# Patient Record
Sex: FEMALE | Race: WHITE | NOT HISPANIC OR LATINO | Employment: OTHER | URBAN - METROPOLITAN AREA
[De-identification: names, ages, dates, MRNs, and addresses within clinical notes are randomized per-mention and may not be internally consistent; named-entity substitution may affect disease eponyms.]

---

## 2023-04-27 ENCOUNTER — HOSPITAL ENCOUNTER (EMERGENCY)
Facility: CLINIC | Age: 78
Discharge: HOME | End: 2023-04-27
Attending: PHYSICIAN ASSISTANT | Admitting: PHYSICIAN ASSISTANT
Payer: MEDICARE

## 2023-04-27 VITALS
SYSTOLIC BLOOD PRESSURE: 195 MMHG | RESPIRATION RATE: 20 BRPM | HEART RATE: 77 BPM | DIASTOLIC BLOOD PRESSURE: 89 MMHG | TEMPERATURE: 98.1 F | WEIGHT: 170 LBS | OXYGEN SATURATION: 96 %

## 2023-04-27 DIAGNOSIS — M54.32 LEFT SIDED SCIATICA: ICD-10-CM

## 2023-04-27 PROCEDURE — 99284 EMERGENCY DEPT VISIT MOD MDM: CPT | Performed by: PHYSICIAN ASSISTANT

## 2023-04-27 PROCEDURE — 96372 THER/PROPH/DIAG INJ SC/IM: CPT | Performed by: PHYSICIAN ASSISTANT

## 2023-04-27 PROCEDURE — 250N000011 HC RX IP 250 OP 636: Performed by: PHYSICIAN ASSISTANT

## 2023-04-27 RX ORDER — ORPHENADRINE CITRATE 30 MG/ML
60 INJECTION INTRAMUSCULAR; INTRAVENOUS ONCE
Status: COMPLETED | OUTPATIENT
Start: 2023-04-27 | End: 2023-04-27

## 2023-04-27 RX ORDER — IBUPROFEN 600 MG/1
600 TABLET, FILM COATED ORAL EVERY 6 HOURS PRN
COMMUNITY

## 2023-04-27 RX ORDER — PREDNISONE 20 MG/1
40 TABLET ORAL DAILY
Qty: 10 TABLET | Refills: 0 | Status: SHIPPED | OUTPATIENT
Start: 2023-04-27

## 2023-04-27 RX ORDER — OXYCODONE HYDROCHLORIDE 5 MG/1
5 TABLET ORAL EVERY 6 HOURS PRN
Qty: 12 TABLET | Refills: 0 | Status: SHIPPED | OUTPATIENT
Start: 2023-04-27

## 2023-04-27 RX ORDER — KETOROLAC TROMETHAMINE 30 MG/ML
30 INJECTION, SOLUTION INTRAMUSCULAR; INTRAVENOUS ONCE
Status: COMPLETED | OUTPATIENT
Start: 2023-04-27 | End: 2023-04-27

## 2023-04-27 RX ADMIN — KETOROLAC TROMETHAMINE 30 MG: 30 INJECTION, SOLUTION INTRAMUSCULAR at 21:19

## 2023-04-27 RX ADMIN — ORPHENADRINE CITRATE 60 MG: 30 INJECTION INTRAMUSCULAR; INTRAVENOUS at 21:19

## 2023-04-27 ASSESSMENT — ACTIVITIES OF DAILY LIVING (ADL): ADLS_ACUITY_SCORE: 35

## 2023-04-27 NOTE — ED TRIAGE NOTES
Pt presents with severe left posterior leg pain. Pt unable to sit down. Pt states has been going on for one month.      Triage Assessment       Row Name 04/27/23 6714       Triage Assessment (Adult)    Airway WDL WDL       Respiratory WDL    Respiratory WDL WDL       Skin Circulation/Temperature WDL    Skin Circulation/Temperature WDL WDL       Cardiac WDL    Cardiac WDL WDL       Peripheral/Neurovascular WDL    Peripheral Neurovascular WDL WDL       Cognitive/Neuro/Behavioral WDL    Cognitive/Neuro/Behavioral WDL WDL

## 2023-04-28 NOTE — DISCHARGE INSTRUCTIONS
When you get home, try taking an oxycodone to help with your pain.  This will make you sleepy so do not drive when taking it.  It can also make you constipated so take with stool softeners.  Try to reserve this for severe pain or just at bedtime.    Additional pain medication you can take are Tylenol or ibuprofen.  These do not interact with the oxycodone so they are safe to take together.    The prednisone prescribed will help reduce inflammation in your back to help reduce this pain hopefully.  Take as prescribed.    You can also try heat or ice to the back, stretching, massage, IcyHot, or other conservative measures as desired.    If symptoms are not improved with these therapies after 5 days please follow-up in the clinic for further evaluation.  If you develop any worsening symptoms please return to the emergency department.    Thank you for choosing [facility] [place]. It was a pleasure taking care of you today. If you have any questions, please call [phone].    Sunshine Medina PA-C

## 2023-04-28 NOTE — ED PROVIDER NOTES
History     Chief Complaint   Patient presents with     Leg Pain       HPI  Hannah Riggins is a 77 year old female who presents to the emergency department complaining of right leg pain. The patient reports she has had ongoing pain in her right buttock down her posterior thigh for quite a while, months even.  It seems to have flared up around Easter a couple weeks ago and then today it really got bad.  She was vacuuming yesterday and wonders if that extra physical activity triggered it.  She denies any falls or trauma to the back.  She denies any weakness or numbness down her legs.  No numbness in the groin or loss of bowel or bladder control.  No fevers.  She has been taking ibuprofen and Tylenol for pain control, most recently ibuprofen 6+ hours ago.  It hurts to sit and apply pressure on her butt right now.  She had been using a tennis ball and rolling things out which did alleviate symptoms some for a while.  She has been stretching and walking to keep moving.      Allergies:  Allergies   Allergen Reactions     No Known Allergies        Problem List:    There are no problems to display for this patient.       Past Medical History:    No past medical history on file.    Past Surgical History:    No past surgical history on file.    Family History:    No family history on file.    Social History:  Marital Status:  Single [1]        Medications:    ibuprofen (ADVIL/MOTRIN) 600 MG tablet  oxyCODONE (ROXICODONE) 5 MG tablet  predniSONE (DELTASONE) 20 MG tablet          Review of Systems   All other systems reviewed and are negative.      Physical Exam   BP: (!) 234/96  Pulse: 87  Temp: 98.1  F (36.7  C)  Resp: 16  Weight: 77.1 kg (170 lb)  SpO2: 99 %      Physical Exam  Vitals and nursing note reviewed.   Constitutional:       General: She is not in acute distress.     Appearance: Normal appearance. She is not ill-appearing, toxic-appearing or diaphoretic.   HENT:      Head: Normocephalic and atraumatic.       Nose: Nose normal.   Pulmonary:      Effort: Pulmonary effort is normal. No respiratory distress.   Musculoskeletal:      Cervical back: Normal and neck supple.      Thoracic back: Normal.      Lumbar back: Normal.      Comments: Right hip: Tenderness noted to the posterior gluteus soft tissues overlying the ischium into the upper posterior thigh.  No tenderness over the greater trochanter.  Normal range of motion of the hip.   Skin:     General: Skin is warm and dry.   Neurological:      General: No focal deficit present.      Mental Status: She is alert and oriented to person, place, and time. Mental status is at baseline.      Motor: No weakness.      Gait: Gait normal.      Comments: Patient with steady gait and normal strength in lower extremities.   Psychiatric:         Mood and Affect: Mood normal.         Behavior: Behavior normal.            ED Course           Procedures      No results found for this or any previous visit (from the past 24 hour(s)).    Medications   ketorolac (TORADOL) injection 30 mg (30 mg Intramuscular $Given 4/27/23 2119)   orphenadrine (NORFLEX) injection 60 mg (60 mg Intramuscular $Given 4/27/23 2119)          Assessments & Plan (with Medical Decision Making)  Hannah Riggins is a 77 year old female who presented to the ED complaining of left hip/buttock pain radiating down her thigh.  She has had this for quite a while but it is gotten worse in the last 24 hours.  She pain worse with sitting so she presents standing up.  She was hypertensive on arrival but otherwise had normal vital signs.  No symptoms of hypertension here today.  Exam revealed focal tenderness in the soft tissues of the gluteus down into the posterior thigh.  Suspect some muscular component but also it sounds like some sciatic pain.  Patient was given IM Toradol here and Norflex which brought the pain down to a more tolerable level.  In regard to imaging, without direct trauma and no warning neurological symptoms  I do not think advanced imaging warranted today in the ED.  I discussed management options with the patient and her daughters.  Patient was agreeable to a short course of prednisone.  I will also give her a small prescription of oxycodone for severe breakthrough pain and she was encouraged to continue with ibuprofen or Tylenol as needed along with ice or heat, gentle stretching, massage.  She can follow-up with her clinic provider in a week or so if things are not improving.  Return precautions were provided.  All questions answered and patient discharged home in suitable condition.     I have reviewed the nursing notes.    I have reviewed the findings, diagnosis, plan and need for follow up with the patient.      Discharge Medication List as of 4/27/2023 10:21 PM      START taking these medications    Details   oxyCODONE (ROXICODONE) 5 MG tablet Take 1 tablet (5 mg) by mouth every 6 hours as needed for severe pain, Disp-12 tablet, R-0, [facility]      predniSONE (DELTASONE) 20 MG tablet Take 2 tablets (40 mg) by mouth daily, Disp-10 tablet, R-0, [facility]             Final diagnoses:   Left sided sciatica     Note: Chart documentation done in part with Dragon Voice Recognition software. Although reviewed after completion, some word and grammatical errors may remain.     4/27/2023   [facility] EMERGENCY DEPT     Sunshine Medina PA-C  04/28/23 0036